# Patient Record
Sex: FEMALE | Race: WHITE
[De-identification: names, ages, dates, MRNs, and addresses within clinical notes are randomized per-mention and may not be internally consistent; named-entity substitution may affect disease eponyms.]

---

## 2017-01-01 ENCOUNTER — HOSPITAL ENCOUNTER (EMERGENCY)
Dept: HOSPITAL 62 - ER | Age: 42
Discharge: HOME | End: 2017-01-01
Payer: MEDICAID

## 2017-01-01 VITALS — DIASTOLIC BLOOD PRESSURE: 67 MMHG | SYSTOLIC BLOOD PRESSURE: 111 MMHG

## 2017-01-01 DIAGNOSIS — N39.0: Primary | ICD-10-CM

## 2017-01-01 DIAGNOSIS — F17.210: ICD-10-CM

## 2017-01-01 DIAGNOSIS — M54.5: ICD-10-CM

## 2017-01-01 DIAGNOSIS — R30.0: ICD-10-CM

## 2017-01-01 DIAGNOSIS — Z98.51: ICD-10-CM

## 2017-01-01 DIAGNOSIS — R11.0: ICD-10-CM

## 2017-01-01 LAB
ALBUMIN SERPL-MCNC: 4.5 G/DL (ref 3.5–5)
ALP SERPL-CCNC: 59 U/L (ref 38–126)
ALT SERPL-CCNC: 24 U/L (ref 9–52)
ANION GAP SERPL CALC-SCNC: 13 MMOL/L (ref 5–19)
APPEARANCE UR: (no result)
AST SERPL-CCNC: 13 U/L (ref 14–36)
BASOPHILS # BLD AUTO: 0 10^3/UL (ref 0–0.2)
BASOPHILS NFR BLD AUTO: 0.2 % (ref 0–2)
BILIRUB DIRECT SERPL-MCNC: 0 MG/DL (ref 0–0.3)
BILIRUB SERPL-MCNC: 1 MG/DL (ref 0.2–1.3)
BILIRUB UR QL STRIP: NEGATIVE
BUN SERPL-MCNC: 13 MG/DL (ref 7–20)
CALCIUM: 9.4 MG/DL (ref 8.4–10.2)
CHLORIDE SERPL-SCNC: 105 MMOL/L (ref 98–107)
CO2 SERPL-SCNC: 23 MMOL/L (ref 22–30)
CREAT SERPL-MCNC: 0.6 MG/DL (ref 0.52–1.25)
EOSINOPHIL # BLD AUTO: 0 10^3/UL (ref 0–0.6)
EOSINOPHIL NFR BLD AUTO: 0 % (ref 0–6)
ERYTHROCYTE [DISTWIDTH] IN BLOOD BY AUTOMATED COUNT: 14.5 % (ref 11.5–14)
GLUCOSE SERPL-MCNC: 86 MG/DL (ref 75–110)
GLUCOSE UR STRIP-MCNC: NEGATIVE MG/DL
HCT VFR BLD CALC: 38 % (ref 36–47)
HGB BLD-MCNC: 12.5 G/DL (ref 12–15.5)
HGB HCT DIFFERENCE: -0.5
KETONES UR STRIP-MCNC: 20 MG/DL
LYMPHOCYTES # BLD AUTO: 1.8 10^3/UL (ref 0.5–4.7)
LYMPHOCYTES NFR BLD AUTO: 12.4 % (ref 13–45)
MCH RBC QN AUTO: 26.2 PG (ref 27–33.4)
MCHC RBC AUTO-ENTMCNC: 33 G/DL (ref 32–36)
MCV RBC AUTO: 79 FL (ref 80–97)
MONOCYTES # BLD AUTO: 1.1 10^3/UL (ref 0.1–1.4)
MONOCYTES NFR BLD AUTO: 7.5 % (ref 3–13)
NEUTROPHILS # BLD AUTO: 11.4 10^3/UL (ref 1.7–8.2)
NEUTS SEG NFR BLD AUTO: 79.9 % (ref 42–78)
NITRITE UR QL STRIP: NEGATIVE
PH UR STRIP: 6 [PH] (ref 5–9)
POTASSIUM SERPL-SCNC: 4 MMOL/L (ref 3.6–5)
PROT SERPL-MCNC: 7.8 G/DL (ref 6.3–8.2)
PROT UR STRIP-MCNC: NEGATIVE MG/DL
RBC # BLD AUTO: 4.79 10^6/UL (ref 3.72–5.28)
SODIUM SERPL-SCNC: 140.5 MMOL/L (ref 137–145)
SP GR UR STRIP: 1.01
UROBILINOGEN UR-MCNC: NEGATIVE MG/DL (ref ?–2)
WBC # BLD AUTO: 14.3 10^3/UL (ref 4–10.5)

## 2017-01-01 PROCEDURE — 87186 SC STD MICRODIL/AGAR DIL: CPT

## 2017-01-01 PROCEDURE — S0119 ONDANSETRON 4 MG: HCPCS

## 2017-01-01 PROCEDURE — 87086 URINE CULTURE/COLONY COUNT: CPT

## 2017-01-01 PROCEDURE — 99283 EMERGENCY DEPT VISIT LOW MDM: CPT

## 2017-01-01 PROCEDURE — 80053 COMPREHEN METABOLIC PANEL: CPT

## 2017-01-01 PROCEDURE — 36415 COLL VENOUS BLD VENIPUNCTURE: CPT

## 2017-01-01 PROCEDURE — 85025 COMPLETE CBC W/AUTO DIFF WBC: CPT

## 2017-01-01 PROCEDURE — 81001 URINALYSIS AUTO W/SCOPE: CPT

## 2017-01-01 PROCEDURE — 87088 URINE BACTERIA CULTURE: CPT

## 2017-01-01 NOTE — ER DOCUMENT REPORT
ED Medical Screen (RME)





- General


Time seen by provider: 12:20


Mode of Arrival: Ambulatory


Information source: Patient


TRAVEL OUTSIDE OF THE [place] IN LAST 30 DAYS: No





<CORNELIO NO - Last Filed: 01/01/17 12:20>





<APPLE FERREIRA - Last Filed: 01/10/17 12:14>





- General


Stated Complaint: BACK PAIN/NAUSEA


Notes: 


40 yo female with right flank pain, dysuria, urgency with small amounts of 

urine since friday. Nausea with some vomiting. Feels like she has another 

kidney infection. (CORNELIO NO)





- Related Data


Allergies/Adverse Reactions: 


 





No Known Allergies Allergy (Verified 02/06/14 10:12)


 











Past Medical History


Past Surgical History: Reports: Hx Appendectomy, Hx Tubal Ligation





- Immunizations


Hx Diphtheria, Pertussis, Tetanus Vaccination: Yes





<CORNELIO NO - Last Filed: 01/01/17 12:20>





Course





- Laboratory


Result Diagrams: 


 01/01/17 13:27





 01/01/17 13:27





<APPLE FERREIRA - Last Filed: 01/10/17 12:14>





- Vital Signs


Vital signs: 





 











Temp Pulse Resp BP Pulse Ox


 


 98.4 F   99   16   111/67   99 


 


 01/01/17 15:33  01/01/17 15:33  01/01/17 15:33  01/01/17 15:33  01/01/17 15:33











 (APPLE FERREIRA)





- Laboratory


Laboratory results interpreted by me: 





 











  01/01/17 01/01/17 01/01/17





  12:27 13:27 13:27


 


WBC   14.3 H 


 


MCV   79 L 


 


MCH   26.2 L 


 


RDW   14.5 H 


 


Seg Neutrophils %   79.9 H 


 


Lymphocytes %   12.4 L 


 


Absolute Neutrophils   11.4 H 


 


AST    13 L


 


Urine Ketones  20 H  


 


Urine Blood  MODERATE H  


 


Ur Leukocyte Esterase  SMALL H  











 (APPLE FERREIRA)





Doctor's Discharge





<CORNELIO NO - Last Filed: 01/01/17 12:20>





<APPLE FERREIRA - Last Filed: 01/10/17 12:14>





- Discharge


Clinical Impression: 


 Acute low back pain, Urinary tract infection





Condition: Stable


Disposition: HOME, SELF-CARE


Additional Instructions: 


Urinary Tract Infection:





     Your evaluation indicates that you have a urinary tract infection. This is 

due to germs growing in the bladder.  This is a common problem.


     This infection usually responds quickly to antibiotics.  Your antibiotic 

should be taken exactly as prescribed.  Drink plenty of fluids -- three to four 

quarts a day.


     Occasionally, a bladder anesthetic will be prescribed to help stop the 

feeling of urgency until the antibiotic has a chance to clear the infection.  

This may cause your urine to be dark orange.


     Certain urine infections require a culture.  If the doctor obtained a 

culture, the results will be back in two days.  You should call to see if a 

change in treatment is needed.


     A repeat urinalysis after you finish treatment is often recommended.  The 

physician will let you know if further testing is required.


     Call the doctor if you develop fever, chills, flank pain, inability to 

urinate, or blood in the urine.








Low Back Pain:





     Three out of every four people will have an episode of disabling back pain 

during their lifetime.  Most commonly the pain is due to straining of the 

muscles and ligaments in the low back.


     Usual treatment includes: 


(1) Rest on a firm surface.  Avoid lying on your stomach.  


(2) Ice pack the painful area.  After a few days, gentle heat may be used 

intermittently to relax the area, or ice packs can be continued.  


(3) Medication may be needed -- muscle relaxers and antiinflammatory medicines 

are commonly used.  


(4) As the back improves, exercises are prescribed to strengthen the back and 

abdominal muscles.


     Your doctor will advise you on the proper care for your back at each stage 

in your recovery.  You may be better in a few days -- or healing may take 

several weeks.


     If new symptoms of a "herniated disc" (radiation of pain, numbness, or 

tingling down the back of the leg or weakness in the leg) occur, you should be 

re-examined.  Further testing may be necessary.








DRINK PLENTY OF FLUIDS.


TAKE THE MEDICATION AS PRESCRIBED.


TAKE TYLENOL AND MOTRIN OR ALEVE FOR LOW BACK PAIN.


TAKE AZO-STANDARD FOR BURNING WITH URINATION.


FOLLOW UP WITH YOUR DOCTOR THIS WEEK IF NOT IMPROVING.





RETURN TO THE EMERGENCY ROOM IF ANY NEW OR WORSENING SYMPTOMS.











Prescriptions: 


Cephalexin Monohydrate [Keflex 500 mg Capsule] 500 mg PO TID #15 capsule


Referrals: 


JONO ERAZO MD [Primary Care Provider] - Follow up as needed

## 2017-01-01 NOTE — ER DOCUMENT REPORT
ED GI/





<OMARI ALEXANDER - Last Filed: 01/01/17 15:19>





- General


Mode of Arrival: Ambulatory


Information source: Patient


TRAVEL OUTSIDE OF THE [place] IN LAST 30 DAYS: No





- HPI


Patient complains to provider of: Dysuria, Flank pain


Onset: Other - "few days ago"


Location: Left flank, Right flank


Associated symptoms: Other - see above





<SHAYNE ERAZO - Last Filed: 01/01/17 15:53>





- General


Chief Complaint: Flank Pain


Stated Complaint: BACK PAIN/NAUSEA


Notes: 


41 year old female with history of endometriosis presents to the ED complaining 

of bilateral flank pain that began "a few days ago". Patient is additionally 

complaining of dysuria, burning with urination, nausea, and a cough.  (SHAYNE ERAZO)





- Related Data


Allergies/Adverse Reactions: 


 





No Known Allergies Allergy (Verified 02/06/14 10:12)


 











Past Medical History





- General


Information source: Patient





- Social History


Smoking Status: Current Every Day Smoker


Cigarette use (# per day): Yes - 1 ppd


Family History: Reviewed & Not Pertinent


Renal/ Medical History: Reports: Other - Endometriosis


Past Surgical History: Reports: Hx Appendectomy, Hx Tubal Ligation





- Immunizations


Hx Diphtheria, Pertussis, Tetanus Vaccination: Yes





<SHAYNE ERAZO - Last Filed: 01/01/17 15:53>





Review of Systems





- Review of Systems


Constitutional: No symptoms reported


EENT: No symptoms reported


Cardiovascular: No symptoms reported


Respiratory: See HPI, Cough


Gastrointestinal: See HPI, Nausea


Genitourinary: See HPI, Burning, Dysuria, Flank pain - bilateral


Female Genitourinary: No symptoms reported


Musculoskeletal: No symptoms reported


Skin: No symptoms reported


Hematologic/Lymphatic: No symptoms reported


Neurological/Psychological: No symptoms reported


-: Yes All other systems reviewed and negative





<SHAYNE ERAZO - Last Filed: 01/01/17 15:53>





Physical Exam





- General


General appearance: Alert


In distress: None





- HEENT


Head: Normocephalic, Atraumatic


Eyes: Normal


Extraocular movements intact: Yes


Pupils: PERRL





- Respiratory


Respiratory status: No respiratory distress


Breath sounds: Rhonchi - bilateral





- Cardiovascular


Rhythm: Regular


Heart sounds: Normal auscultation





- Abdominal


Inspection: Normal





- Back


Back: Tender - paraspinal lumbar very tender to palpate





- Extremities


General upper extremity: Normal inspection, Normal ROM


General lower extremity: Normal inspection, Normal ROM





- Neurological


Neuro grossly intact: Yes





- Psychological


Associated symptoms: Normal affect, Normal mood





- Skin


Skin Temperature: Warm


Skin Moisture: Dry


Skin Color: Normal





<SHAYNE ERAZO - Last Filed: 01/01/17 15:53>





- Vital signs


Vitals: 


 











Temp Pulse Resp BP Pulse Ox


 


 98.6 F   122 H  18   130/81 H  100 


 


 01/01/17 12:19  01/01/17 12:19  01/01/17 12:19  01/01/17 12:19  01/01/17 12:19








 (OMARI ALEXANDER)





Course





- Laboratory


Result Diagrams: 


 01/01/17 13:27





 01/01/17 13:27





<OMARI ALEXANDER - Last Filed: 01/01/17 15:19>





- Laboratory


Result Diagrams: 


 01/01/17 13:27





 01/01/17 13:27





<SHAYNE ERAZO - Last Filed: 01/01/17 15:53>





- Vital Signs


Vital signs: 


 











Temp Pulse Resp BP Pulse Ox


 


 98.4 F   99   16   111/67   99 


 


 01/01/17 15:33  01/01/17 15:33  01/01/17 15:33  01/01/17 15:33  01/01/17 15:33








 (OMARI ALEXANDER)


 (SHAYNE ERAZO)





- Laboratory


Laboratory results interpreted by me: 


 











  01/01/17 01/01/17 01/01/17





  12:27 13:27 13:27


 


WBC   14.3 H 


 


MCV   79 L 


 


MCH   26.2 L 


 


RDW   14.5 H 


 


Seg Neutrophils %   79.9 H 


 


Lymphocytes %   12.4 L 


 


Absolute Neutrophils   11.4 H 


 


AST    13 L


 


Urine Ketones  20 H  


 


Urine Blood  MODERATE H  


 


Ur Leukocyte Esterase  SMALL H  








 (OMARI ALEXANDER)


 (SHAYNE ERAZO)





Scribe Documentation





- Scribe


Written by Ramiro:: Ramiro Sabillon, 01/01/2017 15:52


acting as scribe for :: Roseann





<SHAYNE ERAZO - Last Filed: 01/01/17 15:53>

## 2017-01-03 ENCOUNTER — HOSPITAL ENCOUNTER (OUTPATIENT)
Dept: HOSPITAL 62 - OD | Age: 42
End: 2017-01-03
Attending: PHYSICIAN ASSISTANT
Payer: MEDICAID

## 2017-01-03 DIAGNOSIS — R10.9: Primary | ICD-10-CM

## 2017-01-03 PROCEDURE — 74000: CPT

## 2017-03-12 ENCOUNTER — HOSPITAL ENCOUNTER (EMERGENCY)
Dept: HOSPITAL 62 - ER | Age: 42
Discharge: HOME | End: 2017-03-12
Payer: MEDICAID

## 2017-03-12 VITALS — DIASTOLIC BLOOD PRESSURE: 63 MMHG | SYSTOLIC BLOOD PRESSURE: 109 MMHG
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
## 2017-03-12 DIAGNOSIS — M79.642: ICD-10-CM

## 2017-03-12 DIAGNOSIS — T23.062A: Primary | ICD-10-CM

## 2017-03-12 DIAGNOSIS — F17.200: ICD-10-CM

## 2017-03-12 PROCEDURE — 99283 EMERGENCY DEPT VISIT LOW MDM: CPT
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
## 2017-08-24 ENCOUNTER — HOSPITAL ENCOUNTER (EMERGENCY)
Dept: HOSPITAL 62 - ER | Age: 42
Discharge: HOME | End: 2017-08-24
Payer: SELF-PAY

## 2017-08-24 VITALS — SYSTOLIC BLOOD PRESSURE: 128 MMHG | DIASTOLIC BLOOD PRESSURE: 74 MMHG

## 2017-08-24 DIAGNOSIS — J02.9: ICD-10-CM

## 2017-08-24 DIAGNOSIS — J02.0: Primary | ICD-10-CM

## 2017-08-24 DIAGNOSIS — R07.0: ICD-10-CM

## 2017-08-24 DIAGNOSIS — H92.03: ICD-10-CM

## 2017-08-24 PROCEDURE — 99283 EMERGENCY DEPT VISIT LOW MDM: CPT

## 2017-08-24 PROCEDURE — 96372 THER/PROPH/DIAG INJ SC/IM: CPT

## 2017-08-24 PROCEDURE — 87880 STREP A ASSAY W/OPTIC: CPT

## 2017-08-24 NOTE — ER DOCUMENT REPORT
ED General





- General


Chief Complaint: Sore Throat


Stated Complaint: SORE THROAT


Time Seen by Provider: 08/24/17 07:02


TRAVEL OUTSIDE OF THE [place] IN LAST 30 DAYS: No





- HPI


Patient complains to provider of: Throat pain bilateral ear pain


Notes: 





Patient coming in for throat pain bilateral ear pain ongoing for the last 2 

days.  Patient states daughter has similar symptoms.  Patient denies any fevers 

chills nausea vomiting diarrhea.  Patient denies any other sick contacts denies 

any recent antibiotics denies any recent steroids.  Patient does smoke.  Denies 

any difficulty breathing or swallowing states painful swallowing.  No drooling 

noted upon evaluation to trismus patient is maintaining her airway patient is 

no signs of impending airway compromise





- Related Data


Allergies/Adverse Reactions: 


 





No Known Allergies Allergy (Verified 08/24/17 06:40)


 











Past Medical History





- Social History


Smoking Status: Unknown if Ever Smoked


Chew tobacco use (# tins/day): No


Frequency of alcohol use: None


Drug Abuse: None


Family History: Reviewed & Not Pertinent


Patient has suicidal ideation: No


Patient has homicidal ideation: No


Renal/ Medical History: Denies: Hx Peritoneal Dialysis


GI Medical History: Reports: Hx Gastroesophageal Reflux Disease


Past Surgical History: Reports: Hx Appendectomy, Hx Tubal Ligation





- Immunizations


Hx Diphtheria, Pertussis, Tetanus Vaccination: Yes





Review of Systems





- Review of Systems


Constitutional: No symptoms reported


EENT: Other - Throat pain


Cardiovascular: No symptoms reported


Respiratory: No symptoms reported


Gastrointestinal: No symptoms reported


Genitourinary: No symptoms reported


Female Genitourinary: No symptoms reported


Musculoskeletal: No symptoms reported


Skin: No symptoms reported


Hematologic/Lymphatic: No symptoms reported


Neurological/Psychological: No symptoms reported





Physical Exam





- Vital signs


Vitals: 


 











Temp Pulse Resp BP Pulse Ox


 


 98.9 F   114 H  18   128/74 H  98 


 


 08/24/17 06:37  08/24/17 06:37  08/24/17 06:37  08/24/17 06:37  08/24/17 06:37











Interpretation: Normal





- General


General appearance: Appears well, Alert





- HEENT


Head: Normocephalic, Atraumatic


Eyes: Normal


Conjunctiva: Normal


Cornea: Normal


Extraocular movements intact: Yes


Eyelashes: Normal


Pupils: PERRL


Ears: Normal


External canal: Normal


Tympanic membrane: Normal


Sinus: Normal


Nasal: Normal


Pharynx: Erythema, Exudate


Neck: Normal





- Respiratory


Respiratory status: No respiratory distress


Chest status: Nontender


Breath sounds: Normal


Chest palpation: Normal





- Cardiovascular


Rhythm: Regular


Heart sounds: Normal auscultation


Murmur: No





- Abdominal


Inspection: Normal


Distension: No distension


Bowel sounds: Normal


Tenderness: Nontender


Organomegaly: No organomegaly





- Back


Back: Normal, Nontender





- Extremities


General upper extremity: Normal inspection, Nontender, Normal color, Normal ROM

, Normal temperature


General lower extremity: Normal inspection, Nontender, Normal color, Normal ROM

, Normal temperature, Normal weight bearing.  No: Cathie's sign





- Neurological


Neuro grossly intact: Yes


Cognition: Normal


Orientation: AAOx4


[place] Coma Scale Eye Opening: Spontaneous


[place] Coma Scale Verbal: Oriented


[place] Coma Scale Motor: Obeys Commands


[place] Coma Scale Total: 15


Speech: Normal


Motor strength normal: LUE, RUE, LLE, RLE


Sensory: Normal





- Psychological


Associated symptoms: Normal affect, Normal mood





- Skin


Skin Temperature: Warm


Skin Moisture: Dry


Skin Color: Normal





Course





- Re-evaluation


Re-evalutation: 





08/24/17 14:04


Examinations consistent with strep no signs of impending airway compromise 

patient prefers Bicillin will also give Decadron recommended using Motrin and 

Tylenol for pain control.  No signs of abscess formation.





- Vital Signs


Vital signs: 


 











Temp Pulse Resp BP Pulse Ox


 


 98.9 F   114 H  18   128/74 H  98 


 


 08/24/17 06:37  08/24/17 06:37  08/24/17 06:37  08/24/17 06:37  08/24/17 06:37














Discharge





- Discharge


Clinical Impression: 


 Strep pharyngitis





Condition: Good


Disposition: HOME, SELF-CARE


Instructions:  Strep Throat ([facility])


Additional Instructions: 


It is very important that she continue to drink plenty of fluids to stay 

hydrated.  We gave you a dose of antibiotics today in the ER he would not need 

any further antibiotics.  We also gave you a dose of steroids that will help 

out with your sore throat.  You can expect the pain can to continue for the 

next 72-96 hours.  Please make sure that she stay well-hydrated.


Referrals: 


JONO ERAZO MD [Primary Care Provider] - Follow up as needed

## 2017-10-05 ENCOUNTER — HOSPITAL ENCOUNTER (EMERGENCY)
Dept: HOSPITAL 62 - ER | Age: 42
Discharge: HOME | End: 2017-10-05
Payer: MEDICAID

## 2017-10-05 VITALS — SYSTOLIC BLOOD PRESSURE: 116 MMHG | DIASTOLIC BLOOD PRESSURE: 72 MMHG

## 2017-10-05 DIAGNOSIS — Z98.51: ICD-10-CM

## 2017-10-05 DIAGNOSIS — B96.89: ICD-10-CM

## 2017-10-05 DIAGNOSIS — N76.0: Primary | ICD-10-CM

## 2017-10-05 DIAGNOSIS — M54.9: ICD-10-CM

## 2017-10-05 DIAGNOSIS — F17.200: ICD-10-CM

## 2017-10-05 LAB
APPEARANCE UR: (no result)
BILIRUB UR QL STRIP: NEGATIVE
CHLAM PCR: NOT DETECTED
GLUCOSE UR STRIP-MCNC: NEGATIVE MG/DL
KETONES UR STRIP-MCNC: (no result) MG/DL
NITRITE UR QL STRIP: NEGATIVE
PH UR STRIP: 6 [PH] (ref 5–9)
PROT UR STRIP-MCNC: NEGATIVE MG/DL
SP GR UR STRIP: 1.01
UROBILINOGEN UR-MCNC: 2 MG/DL (ref ?–2)

## 2017-10-05 PROCEDURE — 99283 EMERGENCY DEPT VISIT LOW MDM: CPT

## 2017-10-05 PROCEDURE — 81001 URINALYSIS AUTO W/SCOPE: CPT

## 2017-10-05 PROCEDURE — 87491 CHLMYD TRACH DNA AMP PROBE: CPT

## 2017-10-05 PROCEDURE — 87591 N.GONORRHOEAE DNA AMP PROB: CPT

## 2017-10-05 PROCEDURE — 87210 SMEAR WET MOUNT SALINE/INK: CPT

## 2017-10-05 NOTE — ER DOCUMENT REPORT
ED GI/





- General


Chief Complaint: Urinary Problem


Stated Complaint: BACK PAIN


Time Seen by Provider: 10/05/17 12:29


Mode of Arrival: Ambulatory


Information source: Patient


TRAVEL OUTSIDE OF THE [place] IN LAST 30 DAYS: No





- Related Data


Allergies/Adverse Reactions: 


 





No Known Allergies Allergy (Verified 08/24/17 06:40)


 











Past Medical History





- Social History


Smoking Status: Current Every Day Smoker


Chew tobacco use (# tins/day): No


Frequency of alcohol use: None


Drug Abuse: None


Family History: Reviewed & Not Pertinent


Renal/ Medical History: Denies: Hx Peritoneal Dialysis


GI Medical History: Reports: Hx Gastroesophageal Reflux Disease


Past Surgical History: Reports: Hx Appendectomy, Hx Tubal Ligation





- Immunizations


Hx Diphtheria, Pertussis, Tetanus Vaccination: Yes





Physical Exam





- Vital signs


Vitals: 


 











Temp Pulse BP Pulse Ox


 


 98.1 F   94   112/77   100 


 


 10/05/17 12:12  10/05/17 12:12  10/05/17 12:12  10/05/17 12:12














Course





- Vital Signs


Vital signs: 


 











Temp Pulse Resp BP Pulse Ox


 


 98.1 F   94      112/77   100 


 


 10/05/17 12:12  10/05/17 12:12     10/05/17 12:12  10/05/17 12:12














- Laboratory


Laboratory results interpreted by me: 


 











  10/05/17





  12:15


 


Urine Ketones  TRACE H


 


Urine Blood  MODERATE H


 


Urine Urobilinogen  2.0 H














Discharge





- Discharge


Clinical Impression: 


 Bacterial vaginosis





Condition: Stable


Disposition: HOME, SELF-CARE


Additional Instructions: 


Please follow up with your OBGYN, return here for worsening symptoms. 


Prescriptions: 


Metronidazole [Flagyl 500 mg Tablet] 500 mg PO BID #28 tablet


Forms:  Return to Work

## 2017-12-04 ENCOUNTER — HOSPITAL ENCOUNTER (EMERGENCY)
Dept: HOSPITAL 62 - ER | Age: 42
Discharge: HOME | End: 2017-12-04
Payer: MEDICAID

## 2017-12-04 VITALS — SYSTOLIC BLOOD PRESSURE: 109 MMHG | DIASTOLIC BLOOD PRESSURE: 72 MMHG

## 2017-12-04 DIAGNOSIS — F17.210: ICD-10-CM

## 2017-12-04 DIAGNOSIS — Z98.51: ICD-10-CM

## 2017-12-04 DIAGNOSIS — L02.213: Primary | ICD-10-CM

## 2017-12-04 DIAGNOSIS — M25.512: ICD-10-CM

## 2017-12-04 PROCEDURE — 0H95XZZ DRAINAGE OF CHEST SKIN, EXTERNAL APPROACH: ICD-10-PCS | Performed by: PHYSICIAN ASSISTANT

## 2017-12-04 PROCEDURE — 99283 EMERGENCY DEPT VISIT LOW MDM: CPT

## 2017-12-04 NOTE — ER DOCUMENT REPORT
ED Medical Screen (RME)





- General


Chief Complaint: Skin Sore(s)


Stated Complaint: BREAST PAIN


Time Seen by Provider: 12/04/17 15:27


Mode of Arrival: Ambulatory


Information source: Patient


Notes: 





Patient has bump on the left side of shest above the breast. Appeared Monday. 

Attempted warm compresses and no help. Pain now radiating into the left 

shoulder . It is a burning pain.Hard 


TRAVEL OUTSIDE OF THE [place] IN LAST 30 DAYS: No





- HPI


Onset: Yesterday


Onset/Duration: Gradual, Constant


Quality of pain: Burning, Sharp, Throbbing


Pain Level: 3


Associated Symptoms: None


Exacerbated by: Denies


Relieved by: Denies


Similar symptoms previously: No


Recently seen / treated by doctor: No





- Related Data


Smoking: Non-smoker


Frequency of alcohol use: None


Drug Abuse: None


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


Allergies/Adverse Reactions: 


 





No Known Allergies Allergy (Verified 12/04/17 14:13)


 











Past Medical History





- General


Information source: Patient





- Social History


Cigarette use (# per day): Yes - 1 ppd


Chew tobacco use (# tins/day): No


Frequency of alcohol use: None


Drug Abuse: None


Lives with: Family


Family history: Reviewed & Not Pertinent


Renal/ Medical History: Denies: Hx Peritoneal Dialysis


GI Medical History: Reports: Hx Gastroesophageal Reflux Disease


Past Surgical History: Reports: Hx Appendectomy, Hx Tubal Ligation





- Immunizations


Hx Diphtheria, Pertussis, Tetanus Vaccination: Yes





Review of Systems





- Review of Systems


-: Yes ROS unobtainable due to patient's medical condition


Constitutional: No symptoms reported


EENT: No symptoms reported


Cardiovascular: No symptoms reported


Respiratory: No symptoms reported


Gastrointestinal: No symptoms reported


Genitourinary: No symptoms reported


Female Genitourinary: No symptoms reported


Musculoskeletal: No symptoms reported


Skin: Lumps


Hematologic/Lymphatic: No symptoms reported


Neurological/Psychological: No symptoms reported


-: Yes All other systems reviewed and negative





Physical Exam





- Vital signs


Vitals: 





 











Temp Pulse Resp BP Pulse Ox


 


 99.2 F   94   15   106/74   99 


 


 12/04/17 14:16  12/04/17 14:16  12/04/17 14:16  12/04/17 14:16  12/04/17 14:16











Interpretation: Normal





- General


General appearance: Appears well





- Respiratory


Respiratory status: No respiratory distress


Chest status: Nontender


Breath sounds: Normal





- Cardiovascular


Rhythm: Regular


Heart sounds: Normal auscultation


Murmur: No





- Extremities


General upper extremity: Normal inspection


General lower extremity: Normal inspection





- Skin


Skin Temperature: Warm


Skin Moisture: Dry


Skin Color: Other - Patient has a 2 cm bump on the right side of the chest. 

Tender to touch. Firm with a head. Surrounded by erythema out 2 cm.





Course





- Vital Signs


Vital signs: 





 











Temp Pulse Resp BP Pulse Ox


 


 99.2 F   94   15   106/74   99 


 


 12/04/17 14:16  12/04/17 14:16  12/04/17 14:16  12/04/17 14:16  12/04/17 14:16














Procedures





- Incision and Drainage


  ** Left Chest


Time completed: 15:51


Type: Single


Anesthetic type: 1% Lidocaine


mL's of anesthetic: 1


Blade size: 11


I&D procedure: Betadine prep applied


Incision Method: Incision made by scalpel


Amount/type of drainage: small


Notes: 





12/04/17 15:52


Area cleaned with betadine and I used 11 blade after 2 ml of lidocain. I then 

put in a 1 cm puncture lac and found most to be inflammed tissue. Applied 

preswsure and a hard firm core came out. No drainage. Ptient tolerated tx and 

procedure well. 





Doctor's Discharge





- Discharge


Clinical Impression: 


 Abscess





Condition: Good


Disposition: HOME, SELF-CARE


Instructions:  Abscess ([facility]), Cephalexin ([facility])


Additional Instructions: 


Home and rest. Warm cpomprdesses 3 times a day. Take all the antibiotics. 

Return to ER any concerns


Prescriptions: 


Cephalexin [Keflex] 500 mg PO QID #40 capsule


Fluconazole [Diflucan] 150 mg PO ONCE PRN #1 tablet


 PRN Reason: 


Hydrocodone/Acetaminophen [Norco 7.5-325 mg Tablet] 1 tab PO Q6 #15 tablet


Sulfamethoxazole/Trimethoprim [Bactrim Ds Tablet] 1 each PO BID #20 tablet


Forms:  Elevated Blood Pressure, Smoking Cessation Education

## 2018-10-06 ENCOUNTER — HOSPITAL ENCOUNTER (EMERGENCY)
Dept: HOSPITAL 62 - ER | Age: 43
Discharge: HOME | End: 2018-10-06
Payer: SELF-PAY

## 2018-10-06 VITALS — SYSTOLIC BLOOD PRESSURE: 112 MMHG | DIASTOLIC BLOOD PRESSURE: 84 MMHG

## 2018-10-06 DIAGNOSIS — R30.0: ICD-10-CM

## 2018-10-06 DIAGNOSIS — F17.200: ICD-10-CM

## 2018-10-06 DIAGNOSIS — M54.5: Primary | ICD-10-CM

## 2018-10-06 DIAGNOSIS — R10.9: ICD-10-CM

## 2018-10-06 LAB
APPEARANCE UR: CLEAR
APTT PPP: YELLOW S
BILIRUB UR QL STRIP: NEGATIVE
GLUCOSE UR STRIP-MCNC: NEGATIVE MG/DL
KETONES UR STRIP-MCNC: NEGATIVE MG/DL
NITRITE UR QL STRIP: NEGATIVE
PH UR STRIP: 7 [PH] (ref 5–9)
PROT UR STRIP-MCNC: NEGATIVE MG/DL
SP GR UR STRIP: 1.02
UROBILINOGEN UR-MCNC: 4 MG/DL (ref ?–2)

## 2018-10-06 PROCEDURE — 99283 EMERGENCY DEPT VISIT LOW MDM: CPT

## 2018-10-06 PROCEDURE — 87086 URINE CULTURE/COLONY COUNT: CPT

## 2018-10-06 PROCEDURE — 81001 URINALYSIS AUTO W/SCOPE: CPT

## 2018-10-06 NOTE — ER DOCUMENT REPORT
HPI





- HPI


Pain Level: 5


Notes: 





Patient is a 43-year-old female who presents with chief complaint of right 

flank pain and dysuria times 1 week.  Patient reports she thinks she may have a 

urinary tract infection or kidney infection.  Patient denies any fevers, 

vomiting or chills.





- CONSTITUTIONAL


Constitutional: DENIES: Fever, Chills





- EENT


EENT: DENIES: Sore Throat, Ear Pain, Eye problems





- NEURO


Neurology: DENIES: Headache, Weakness, Vision blurred, Dizzinesss / Vertigo





- CARDIOVASCULAR


Cardiovascular: DENIES: Chest pain





- RESPIRATORY


Respiratory: DENIES: Trouble Breathing, Coughing





- GASTROINTESTINAL


Gastrointestinal: DENIES: Abdominal Pain, Black / Bloody Stools





- REPRODUCTIVE


Reproductive: DENIES: Pregnant:





- MUSCULOSKELETAL


Musculoskeletal: DENIES: Extremity pain





Past Medical History





- General


Information source: Patient





- Social History


Smoking Status: Current Every Day Smoker


Chew tobacco use (# tins/day): No


Frequency of alcohol use: Occasional


Drug Abuse: None


Family History: Reviewed & Not Pertinent


Patient has suicidal ideation: No


Patient has homicidal ideation: No


Renal/ Medical History: Denies: Hx Peritoneal Dialysis


GI Medical History: Reports: Hx Gastroesophageal Reflux Disease


Past Surgical History: Reports: Hx Appendectomy, Hx Tubal Ligation





- Immunizations


Hx Diphtheria, Pertussis, Tetanus Vaccination: Yes





Vertical Provider Document





- CONSTITUTIONAL


Notes: 





PHYSICAL EXAMINATION:





GENERAL: Well-appearing, well-nourished and in no acute distress.





HEAD: Atraumatic, normocephalic.





EYES: Pupils equal round and reactive to light, extraocular movements intact, 

conjunctiva are normal.





ENT: Nares patent, oropharynx clear without exudates.  Moist mucous membranes.





NECK: Normal range of motion, supple without lymphadenopathy





LUNGS: Breath sounds clear to auscultation bilaterally and equal.  No wheezes 

rales or rhonchi.





HEART: Regular rate and rhythm without murmurs





ABDOMEN: Soft, nontender, nondistended abdomen.  No guarding, no rebound.  No 

masses appreciated.





Female : Right CVA tenderness present





Musculoskeletal: Normal range of motion, no pitting or edema.  No cyanosis.





NEUROLOGICAL: Cranial nerves grossly intact.  Normal speech, normal gait.  

Normal sensory, motor exams





PSYCH: Normal mood, normal affect.





SKIN: Warm, Dry, normal turgor, no rashes or lesions noted.





- INFECTION CONTROL


TRAVEL OUTSIDE OF THE [place] IN LAST 30 DAYS: No





Course





- Vital Signs


Vital signs: 


 











Temp Pulse Resp BP Pulse Ox


 


 97.7 F   80   18   112/84   100 


 


 10/06/18 12:14  10/06/18 12:14  10/06/18 12:14  10/06/18 12:14  10/06/18 12:14














Discharge





- Discharge


Clinical Impression: 


 Dysuria





Back pain


Qualifiers:


 Back pain location: low back pain Chronicity: acute Back pain laterality: 

right Sciatica presence: without sciatica Qualified Code(s): M54.5 - Low back 

pain





Condition: Stable


Disposition: HOME, SELF-CARE


Additional Instructions: 


Muscle Strain





     You have strained a muscle -- torn the fibers within the muscle. This 

often occurs with strenuous exertion, or during an injury that suddenly 

stretches the muscle.  The seriousness of a strain varies. Some strains heal 

within days, others cause problems for months.


     X-rays cannot show a muscle strain.  X-rays are taken only if symptoms 

suggest that a fracture could be present.


     The usual treatment of a muscle strain is rest and ice packs. Sometimes, a 

sling, splint, or crutches may be necessary to rest the muscle.  The muscle can 

be used again once pain subsides.  Severe strains require a special exercise 

and stretching program to prevent permanent stiffness and disability.  Your 

doctor will advise you if this will be necessary.


     Call the doctor immediately if pain or swelling becomes severe, or if 

numbness or discoloration develop.





Muscle Relaxers





     Muscle relaxing medications are usually prescribed for acute muscle spasm 

or injury to the neck and back.  They are often combined with antiinflammatory 

pain medication for increased relief.


     You may stop the muscle relaxer when the pain and stiffness have improved.

  Start the medication again if spasms recur.  


     Muscle relaxers may cause drowsiness, especially with the first dose.  Do 

not operate machinery or drive while under the effects of the medication.  Most 

muscle relaxers last up to 24 hours.  Do not combine the medication with 

alcohol.





URINARY TRACT INFECTION:


    Your evaluation indicates that you may have a urinary tract infection. This 

is due to germs growing in the bladder.  This is a common problem.


     This infection usually responds quickly to antibiotics.  Your antibiotic 

should be taken exactly as prescribed.  Drink plenty of fluids -- three to four 

quarts a day.


     Occasionally, a bladder anesthetic will be prescribed to help stop the 

feeling of urgency until the antibiotic has a chance to clear the infection.  

This may cause your urine to be dark orange.


     Certain urine infections require a culture.  If the doctor obtained a 

culture, the results will be back in two days.  You should call to see if a 

change in treatment is needed.


     A repeat urinalysis after you finish treatment is often recommended.  The 

physician will let you know if further testing is required.


     Call the doctor if you develop fever, chills, flank pain, inability to 

urinate, or blood in the urine.





ANTIBIOTIC THERAPY:


     You have been given an antibiotic prescription.  It's important that you 

take all the medication, unless instructed otherwise by your physician.  

Failure to complete the entire course can result in relapse of your condition.


     Common side effects of antibiotics include nausea, intestinal cramping, or 

diarrhea.  Women may develop vaginal yeast infections, and babies can get yeast 

(thrush) in the mouth following the use of antibiotics.  Contact your physician 

if you develop significant side effects from this medication.


     Allergy to this antibiotic can result in hives, wheezing, faintness, or 

itching.  If symptoms of allergy occur, stop the medication and call the doctor.





NITROFURANTOIN (MACRODANTIN, MACROBID):


     You have received a prescription for nitrofurantoin (Macrodantin). This 

antibiotic is used for urinary tract infections.





Women who are pregnant or nursing should notify the physician before taking 

this medicine.  If you have ever had a problem caused by this medication in the 

past, be sure the physician is aware of it.


     Common side effects of this medicine include nausea, vomiting, or 

decreased appetite.  Notify your physician if these side effects become severe.


     Immediately stop this medicine and call the physician if you develop cough

, shortness of breath, chest pain, weakness, jaundice (yellow color of the skin 

and whites of the eyes), or a skin rash.








FOLLOW-UP CARE:


If you have been referred to a physician for follow-up care, call the physician

s office for an appointment as you were instructed or within the next two days.

  If you experience worsening or a significant change in your symptoms, notify 

the physician immediately or return to the Emergency Department at any time for 

re-evaluation.





***Please take medications as prescribed, your urine will be sent for culture 

we will call you if there are any abnormal results.  Follow-up with your 

primary care provider in the next 3-5 days for follow-up, sooner if needed.  

Return to the emergency department if you develop vomiting, fever or any other 

symptom that is concerning to you.***











Prescriptions: 


Hydrocodone/Acetaminophen [Hydrocodon-Acetaminophen 5-325] 1 each PO Q4H PRN #8 

tablet


 PRN Reason: For Pain


Methocarbamol [Robaxin 750 mg Tablet] 750 mg PO ASDIR PRN #40 tablet


 PRN Reason: 


Nitrofurantoin/Nitrofuran Mac [Macrobid 100 mg Capsule] 1 tab PO BID #10 capsule


Referrals: 


JONO ERAZO MD [Primary Care Provider] - Follow up as needed

## 2019-01-27 ENCOUNTER — HOSPITAL ENCOUNTER (EMERGENCY)
Dept: HOSPITAL 62 - ER | Age: 44
Discharge: HOME | End: 2019-01-27
Payer: SELF-PAY

## 2019-01-27 VITALS — SYSTOLIC BLOOD PRESSURE: 107 MMHG | DIASTOLIC BLOOD PRESSURE: 61 MMHG

## 2019-01-27 DIAGNOSIS — M54.9: ICD-10-CM
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
## 2019-01-27 DIAGNOSIS — F17.200: ICD-10-CM

## 2019-01-27 DIAGNOSIS — M54.2: ICD-10-CM

## 2019-01-27 DIAGNOSIS — M25.511: ICD-10-CM

## 2019-01-27 DIAGNOSIS — S16.1XXA: ICD-10-CM

## 2019-01-27 DIAGNOSIS — S43.401A: Primary | ICD-10-CM

## 2019-01-27 DIAGNOSIS — M54.5: ICD-10-CM

## 2019-01-27 DIAGNOSIS — R51: ICD-10-CM

## 2019-01-27 PROCEDURE — 72125 CT NECK SPINE W/O DYE: CPT

## 2019-01-27 PROCEDURE — 72070 X-RAY EXAM THORAC SPINE 2VWS: CPT

## 2019-01-27 PROCEDURE — 72110 X-RAY EXAM L-2 SPINE 4/>VWS: CPT

## 2019-01-27 PROCEDURE — 99284 EMERGENCY DEPT VISIT MOD MDM: CPT

## 2019-01-27 NOTE — RADIOLOGY REPORT (SQ)
EXAM DESCRIPTION:  L SPINE WHOLE



COMPLETED DATE/TIME:  1/27/2019 10:44 am



REASON FOR STUDY:  fall fall injury lumbar pain



COMPARISON:  Lumbar spine plain films 11/3/2011



NUMBER OF VIEWS:  Five views including obliques.



TECHNIQUE:  AP, lateral, oblique, and sacral radiographic images acquired of the lumbar spine.



LIMITATIONS:  None.



FINDINGS:  MINERALIZATION: Normal.

SEGMENTATION: 6 lumbar vertebral bodies, with a fairly well-developed S1-2 disc space

ALIGNMENT: Normal.

VERTEBRAE: Maintained height.  No fracture or worrisome bone lesion.

DISCS: Preserved height.  No significant osteophytes or end plate irregularity.

POSTERIOR ELEMENTS: Pedicles and facets are intact.  No pars defect or posterior arch defects.  Lower
 lumbar mild facet arthropathy.

HARDWARE: None in the spine.  Surgical clips right lower quadrant

PARASPINAL SOFT TISSUES: Normal.

PELVIS: Not in the field of view.  SI joints unremarkable.

OTHER: No other significant finding.



IMPRESSION:  No acute findings



TECHNICAL DOCUMENTATION:  JOB ID:  [id number]

 2011 [employer]- All Rights Reserved



Reading location - IP/workstation name: [email or web address]

## 2019-01-27 NOTE — ER DOCUMENT REPORT
HPI





- HPI


Patient complains to provider of: Fall


Time Seen by Provider: 01/27/19 09:59


Onset: Yesterday


Onset/Duration: Sudden


Quality of pain: Achy


Pain Level: 4


Context: 





Patient states that the lights to her ports were out and she missed a step 

falling landing on her right shoulder.  Patient complains of right shoulder, 

neck and headache pain with low back pain.  Patient denies any loss of 

consciousness nausea or vomiting.


Associated Symptoms: Headache, Other - Right shoulder, neck and back pain


Exacerbated by: Movement


Relieved by: Denies


Similar symptoms previously: No


Recently seen / treated by doctor: No





- ROS


ROS below otherwise negative: Yes


Systems Reviewed and Negative: Yes All other systems reviewed and negative





- NEURO


Neurology: REPORTS: Headache.  DENIES: Weakness, Vision blurred, Dizzinesss / 

Vertigo





- CARDIOVASCULAR


Cardiovascular: DENIES: Chest pain





- RESPIRATORY


Respiratory: DENIES: Trouble Breathing





- GASTROINTESTINAL


Gastrointestinal: DENIES: Nausea





- REPRODUCTIVE


Reproductive: DENIES: Pregnant:





- MUSCULOSKELETAL


Musculoskeletal: REPORTS: Extremity pain, Back Pain, Neck Pain





- DERM


Skin Color: Normal


Skin Problems: None





Past Medical History





- General


Information source: Patient





- Social History


Smoking Status: Current Every Day Smoker


Smoking Education Provided: Yes


Frequency of alcohol use: None


Drug Abuse: None


Occupation: retail


Family History: Reviewed & Not Pertinent


Renal/ Medical History: Denies: Hx Peritoneal Dialysis


GI Medical History: Reports: Hx Gastritis, Hx Gastroesophageal Reflux Disease


Past Surgical History: Reports: Hx Appendectomy, Hx Tubal Ligation





- Immunizations


Hx Diphtheria, Pertussis, Tetanus Vaccination: Yes





Vertical Provider Document





- CONSTITUTIONAL


Agree With Documented VS: Yes


Exam Limitations: No Limitations


General Appearance: WD/WN, No Apparent Distress





- INFECTION CONTROL


TRAVEL OUTSIDE OF THE [place] IN LAST 30 DAYS: No





- HEENT


HEENT: Atraumatic, Normocephalic, PERRLA





- NECK


Neck: Supple.  negative: Lymphadenopathy-Left, Lymphadenopathy-Right


Notes: 





Cervical midline tenderness C3 through 7 area, no step-off or deformity





- RESPIRATORY


Respiratory: Breath Sounds Normal, No Respiratory Distress





- CARDIOVASCULAR


Cardiovascular: Regular Rate, Regular Rhythm





- GI/ABDOMEN


Gastrointestinal: Abdomen Soft





- BACK


Back: Abnormal Inspection - Right trapezius muscle tenderness with spasm


Notes: 





Patient with thoracic midline tenderness T4 through 7 area and lower lumbar 

tenderness, no step-offs or deformities





- MUSCULOSKELETAL/EXTREMETIES


Musculoskeletal/Extremeties: MAEW, FROM, Tender - Tenderness over right AC 

joint, no dislocation or deformity, tenderness increases with extension and 

abduction, No Edema





- NEURO


Level of Consciousness: Awake, Alert, Appropriate


Motor/Sensory: No Motor Deficit





- DERM


Integumentary: Warm, Dry, No Rash





Course





- Re-evaluation


Re-evalutation: 





01/27/19 11:19


Patient presents with headache with right trapezius muscle tenderness with spasm

concerning for tension type headache.  No meningeal irritation symptoms.  The 

patient presents with headache without signs of CNS bleed, stroke, infection, or

other serious etiology.  The patient is neurologically intact.  Given the 

extremely low risk of these diagnoses further testing and evaluation for these 

possibilities does not appear to be indicated at this time.  The patient 

presents with low back pain without signs of spinal cord compression, cauda 

equina syndrome, infection, aneurysm, or other serious etiology.  The patient is

neurologically intact.  Given the extremely risk of these diagnoses further 

testing and evaluation for these possibilities does not appear to be indicated 

at this time.  Patient has been instructed to return if the symptoms worsen or 

change in any way.





- Vital Signs


Vital signs: 


                                        











Temp Pulse Resp BP Pulse Ox


 


 98.1 F   84   18   112/73   100 


 


 01/27/19 09:58  01/27/19 09:58  01/27/19 09:58  01/27/19 09:58  01/27/19 09:58














- Diagnostic Test


Radiology reviewed: Reports reviewed





Procedures





- Immobilization


  ** Right Shoulder


Pre-Proc Neuro Vasc Exam: Normal


Immobilizer type: Sling


Performed by: PCT


Post-Proc Neuro Vasc Exam: Normal


Alignment checked and good: Yes





Discharge





- Discharge


Clinical Impression: 


 Tension headache





Fall


Qualifiers:


 Encounter type: initial encounter Qualified Code(s): W19.XXXA - Unspecified 

fall, initial encounter





Cervical strain, acute


Qualifiers:


 Encounter type: initial encounter Qualified Code(s): S16.1XXA - Strain of 

muscle, fascia and tendon at neck level, initial encounter





Sprain of right shoulder


Qualifiers:


 Encounter type: initial encounter Shoulder sprain type: unspecified sprain 

Qualified Code(s): S43.401A - Unspecified sprain of right shoulder joint, 

initial encounter





Back pain


Qualifiers:


 Back pain location: back pain in unspecified location Chronicity: acute Back 

pain laterality: unspecified Qualified Code(s): M54.9 - Dorsalgia, unspecified





Condition: Stable


Disposition: HOME, SELF-CARE


Instructions:  Ice & Elevation ([facility]), Low Back Pain ([facility]), Muscle Relaxers 

([facility]), Muscle Strain ([facility]), Neck Injury (Cervical Strain) ([facility]), Shoulder Injury

([facility]), Temporary Sling ([facility]), Tension Headache ([facility]), Upper Back Strain ([facility])


Additional Instructions: 


Return immediately for any new or worsening symptoms





Followup with your primary care provider, call tomorrow to make a followup 

appointment





Follow-up with orthopedics for any persistent pain or problems


Prescriptions: 


Cyclobenzaprine HCl [Flexeril 10 Mg Tablet] 10 mg PO TID #15 tablet


Hydrocodone/Acetaminophen [Norco 5-325 mg Tablet] 1 tab PO Q6 PRN #15 tablet


 PRN Reason: 


Forms:  Return to Work, Smoking Cessation Education


Referrals: 


JONO ERAZO MD [Primary Care Provider] - Follow up as needed


[facility] [Provider Group] - Follow up as needed

## 2019-01-27 NOTE — RADIOLOGY REPORT (SQ)
EXAM DESCRIPTION:  CT CERVICAL SPINE WITHOUT



COMPLETED DATE/TIME:  1/27/2019 10:57 am



REASON FOR STUDY:  fall



COMPARISON:  None.



TECHNIQUE:  Axial images acquired through the cervical spine without intravenous contrast.  Images re
viewed with lung, soft tissue and bone windows.  Reconstructed coronal and sagittal MPR images review
ed.  Images stored on PACS.

All CT scanners at this facility use dose modulation, iterative reconstruction, and/or weight based d
osing when appropriate to reduce radiation dose to as low as reasonably achievable (ALARA).

[facility]: Dose Right  [facility]: CareDose    [facility]: Dose Right    [facility]: Teradose 4D    [facility]: Smart Berggi



RADIATION DOSE:  CT Rad equipment meets quality standard of care and radiation dose reduction techniq
ues were employed. CTDIvol: 13.8 mGy. DLP: 323 mGy-cm. mGy.



LIMITATIONS:  None.



FINDINGS:  ALIGNMENT: Anatomic.

MINERALIZATION: Normal.

VERTEBRAL BODIES: No fractures or dislocation.

DISCS: No significant disc disease.

FACETS, LATERAL MASSES, POSTERIOR ELEMENTS: No fractures.  No dislocation.  No acute findings.

HARDWARE: None in the spine.

VISUALIZED RIBS: No fractures.

LUNG APICES AND SOFT TISSUES: No significant or acute findings.

OTHER: No other significant finding.



IMPRESSION:  NO ACUTE OR SIGNIFICANT FINDINGS IN THE CERVICAL SPINE.



TECHNICAL DOCUMENTATION:  JOB ID:  [id number]

Quality ID # [id number]: Final reports with documentation of one or more dose reduction techniques (e.g., Au
tomated exposure control, adjustment of the mA and/or kV according to patient size, use of iterative 
reconstruction technique)

 2011 [employer]- All Rights Reserved



Reading location - IP/workstation name: [email or web address]

## 2019-01-27 NOTE — RADIOLOGY REPORT (SQ)
EXAM DESCRIPTION:  T SPINE AP/LAT



COMPLETED DATE/TIME:  1/27/2019 10:44 am



REASON FOR STUDY:  fall fall, injury, thoracic pain



COMPARISON:  None.



NUMBER OF VIEWS:  Two views.



TECHNIQUE:  AP and lateral radiographic images acquired of the thoracic spine.



LIMITATIONS:  None.



FINDINGS:  MINERALIZATION: Normal.

ALIGNMENT: Normal.  No scoliosis.

VERTEBRAE: No fracture or bone lesion.  Maintained height, normal segmentation.

DISCS: No significant loss of height or significant narrowing.  No large osteophytes.

HARDWARE: None in the spine.

MEDIASTINUM AND SOFT TISSUES: Normal heart size and aortic contour.  No soft tissue abnormality.

VISUALIZED LUNG FIELDS: Clear.

OTHER: No other significant finding.



IMPRESSION:  NO SIGNIFICANT RADIOGRAPHIC FINDING IN THE THORACIC SPINE.



TECHNICAL DOCUMENTATION:  JOB ID:  [id number]

 2011 [employer]- All Rights Reserved



Reading location - IP/workstation name: [email or web address]

## 2019-10-13 NOTE — ER DOCUMENT REPORT
ED General





- General


Chief Complaint: Flank Pain


Stated Complaint: LOWER BACK PAIN


Time Seen by Provider: 10/13/19 17:03


Primary Care Provider: 


JONO ERAZO MD [Primary Care Provider] - Follow up in 3-5 days


TRAVEL OUTSIDE OF THE [place] IN LAST 30 DAYS: No





- HPI


Notes: 





44-year-old female to the emergency department with complaints of lower 

abdominal pain and bilateral low back and flank pain that has been ongoing for 

several weeks.  She states that she has dark and malodorous urine.  She states 

that she is been having urinary frequency.  She states that when she urinates s

he feels like she has not completely voided.  She states that she was hoping to 

just get better and that is why she was not seen sooner.  She denies any fevers.

 She states that she has had chills.  She admits to nausea but denies any 

vomiting or diarrhea.  She did endorse a history of fibroids and endometriosis. 

Patient also reports that she has had her period for 1 week which is typically 

longer than her normal.  She also induces occasional fatigue.





- Related Data


Allergies/Adverse Reactions: 


                                        





No Known Allergies Allergy (Verified 10/13/19 17:01)


   











Past Medical History





- General


Information source: Patient





- Social History


Smoking Status: Current Every Day Smoker


Chew tobacco use (# tins/day): No


Frequency of alcohol use: None


Drug Abuse: None


Family History: Reviewed & Not Pertinent


Patient has suicidal ideation: No


Patient has homicidal ideation: No


Renal/ Medical History: Denies: Hx Peritoneal Dialysis


GI Medical History: Reports: Hx Gastritis, Hx Gastroesophageal Reflux Disease


Past Surgical History: Reports: Hx Appendectomy, Hx Tonsillectomy, Hx Tubal 

Ligation





- Immunizations


Hx Diphtheria, Pertussis, Tetanus Vaccination: Yes





Review of Systems





- Review of Systems


Constitutional: Chills, Malaise.  denies: Fever


EENT: No symptoms reported


Cardiovascular: denies: Chest pain, Palpitations, Heart racing, Orthopnea, 

Syncope, Dizziness, Lightheaded


Respiratory: denies: Cough, Short of breath


Gastrointestinal: Abdominal pain, Nausea.  denies: Diarrhea, Vomiting


Genitourinary: Frequency, Flank pain.  denies: Burning, Dysuria, Hematuria, 

Incontinence, Retention


Female Genitourinary: No symptoms reported


Musculoskeletal: No symptoms reported


Skin: No symptoms reported


Hematologic/Lymphatic: No symptoms reported


Neurological/Psychological: No symptoms reported


-: Yes All other systems reviewed and negative





Physical Exam





- Vital signs


Vitals: 


                                        











Temp Pulse Resp BP Pulse Ox


 


 98.8 F   94   15   122/84   100 


 


 10/13/19 16:53  10/13/19 16:53  10/13/19 16:53  10/13/19 16:53  10/13/19 16:53














- General


General appearance: Appears well, Alert


In distress: None





- HEENT


Head: Normocephalic, Atraumatic


Eyes: Normal


Pupils: PERRL


Ears: Normal


External canal: Normal


Tympanic membrane: Normal


Sinus: Normal


Nasal: Normal


Mouth/Lips: Normal


Mucous membranes: Normal


Pharynx: Normal.  No: Potential airway comprom.


Neck: Normal, Supple.  No: Lymphadenopathy, Meningismus





- Respiratory


Respiratory status: No respiratory distress


Chest status: Nontender.  No: Pain on movement, Pain with cough, Pain with deep 

breathing


Breath sounds: Normal.  No: Rales, Rhonchi, Stridor, Wheezing


Chest palpation: Normal





- Cardiovascular


Rhythm: Regular


Heart sounds: Normal auscultation


Murmur: No





- Abdominal


Inspection: Normal, Obese


Distension: No distension.  No: Distended


Bowel sounds: Normal


Tenderness: Tender - + suprapubic TTP, negative McBurney's point, no Guzmán's 

sign.  No: McBurney's point, Guzmán's sign, Guarding, Rebound, Other


Organomegaly: No organomegaly





- Back


Back: CVA tenderness - + mild left CVAT, no right CVAT..  No: Vertebra 

tenderness





- Extremities


General upper extremity: Normal inspection, Nontender, Normal color, Normal ROM,

Normal temperature


General lower extremity: Normal inspection, Nontender, Normal color, Normal ROM,

Normal temperature, Normal weight bearing.  No: Cathie's sign





- Neurological


Neuro grossly intact: Yes


Cognition: Normal


Orientation: AAOx4


Gilmar Coma Scale Eye Opening: Spontaneous


Ovid Coma Scale Verbal: Oriented


Gilmar Coma Scale Motor: Obeys Commands


Gilmar Coma Scale Total: 15


Speech: Normal


Motor strength normal: LUE, RUE, LLE, RLE


Sensory: Normal





- Psychological


Associated symptoms: Normal affect, Normal mood





- Skin


Skin Temperature: Warm


Skin Moisture: Dry


Skin Color: Normal





Course





- Re-evaluation


Re-evalutation: 





10/13/19 19:06





Laboratory











  10/13/19 10/13/19 10/13/19





  17:16 17:16 17:16


 


WBC  7.2  


 


RBC  4.76  


 


Hgb  12.4  


 


Hct  38.0  


 


MCV  80  


 


MCH  25.9 L  


 


MCHC  32.5  


 


RDW  15.3 H  


 


Plt Count  280  


 


Lymph % (Auto)  36.7  


 


Mono % (Auto)  4.9  


 


Eos % (Auto)  0.5  


 


Baso % (Auto)  0.7  


 


Absolute Neuts (auto)  4.1  


 


Absolute Lymphs (auto)  2.7  


 


Absolute Monos (auto)  0.4  


 


Absolute Eos (auto)  0.0  


 


Absolute Basos (auto)  0.0  


 


Seg Neutrophils %  57.2  


 


Sodium   139.3 


 


Potassium   4.1 


 


Chloride   104 


 


Carbon Dioxide   24 


 


Anion Gap   11 


 


BUN   16 


 


Creatinine   0.67 


 


Est GFR ( Amer)   > 60 


 


Est GFR (MDRD) Non-Af   > 60 


 


Glucose   81 


 


Calcium   9.5 


 


Total Bilirubin   0.4 


 


Direct Bilirubin   0.1 


 


Neonat Total Bilirubin   Not Reportable 


 


Neonat Direct Bilirubin   Not Reportable 


 


Neonat Indirect Bili   Not Reportable 


 


AST   17 


 


ALT   11 


 


Alkaline Phosphatase   57 


 


Total Protein   8.5 H 


 


Albumin   4.8 


 


Serum HCG, Qual    NEGATIVE


 


Urine Color   


 


Urine Appearance   


 


Urine pH   


 


Ur Specific Gravity   


 


Urine Protein   


 


Urine Glucose (UA)   


 


Urine Ketones   


 


Urine Blood   


 


Urine Nitrite (Reflex)   


 


Urine Bilirubin   


 


Urine Urobilinogen   


 


Leukocyte Esterase Rfl   


 


Urine RBC (Auto)   


 


Urine Bacteria (Auto)   


 


Urine WBC (Reflex)   


 


Squamous Epi Cells Auto   


 


Urine Mucus (Auto)   


 


Urine Ascorbic Acid   














  10/13/19





  17:16


 


WBC 


 


RBC 


 


Hgb 


 


Hct 


 


MCV 


 


MCH 


 


MCHC 


 


RDW 


 


Plt Count 


 


Lymph % (Auto) 


 


Mono % (Auto) 


 


Eos % (Auto) 


 


Baso % (Auto) 


 


Absolute Neuts (auto) 


 


Absolute Lymphs (auto) 


 


Absolute Monos (auto) 


 


Absolute Eos (auto) 


 


Absolute Basos (auto) 


 


Seg Neutrophils % 


 


Sodium 


 


Potassium 


 


Chloride 


 


Carbon Dioxide 


 


Anion Gap 


 


BUN 


 


Creatinine 


 


Est GFR ( Amer) 


 


Est GFR (MDRD) Non-Af 


 


Glucose 


 


Calcium 


 


Total Bilirubin 


 


Direct Bilirubin 


 


Neonat Total Bilirubin 


 


Neonat Direct Bilirubin 


 


Neonat Indirect Bili 


 


AST 


 


ALT 


 


Alkaline Phosphatase 


 


Total Protein 


 


Albumin 


 


Serum HCG, Qual 


 


Urine Color  YELLOW


 


Urine Appearance  SLIGHTLY-CLOUDY


 


Urine pH  7.0


 


Ur Specific Gravity  1.017


 


Urine Protein  NEGATIVE


 


Urine Glucose (UA)  NEGATIVE


 


Urine Ketones  NEGATIVE


 


Urine Blood  LARGE H


 


Urine Nitrite (Reflex)  NEGATIVE


 


Urine Bilirubin  NEGATIVE


 


Urine Urobilinogen  NEGATIVE


 


Leukocyte Esterase Rfl  NEGATIVE


 


Urine RBC (Auto)  2


 


Urine Bacteria (Auto)  TRACE


 


Urine WBC (Reflex)  2


 


Squamous Epi Cells Auto  8


 


Urine Mucus (Auto)  RARE


 


Urine Ascorbic Acid  NEGATIVE








Impression:  Low back -- noted UA with + WBC and tract bacteria -- given her 

symptoms, will culture the urine and then start on three day course of macrobid.

  Will have her follow with PCP. Patient agrees with the plan. 





- Vital Signs


Vital signs: 


                                        











Temp Pulse Resp BP Pulse Ox


 


 98.8 F   94   15   122/84   100 


 


 10/13/19 16:53  10/13/19 16:53  10/13/19 16:53  10/13/19 16:53  10/13/19 16:53














- Laboratory


Result Diagrams: 


                                 10/13/19 17:16





                                 10/13/19 17:16


Laboratory results interpreted by me: 


                                        











  10/13/19 10/13/19 10/13/19





  17:16 17:16 17:16


 


MCH  25.9 L  


 


RDW  15.3 H  


 


Total Protein   8.5 H 


 


Urine Blood    LARGE H














Discharge





- Discharge


Clinical Impression: 


 Low back pain, Urinary frequency





UTI (urinary tract infection)


Qualifiers:


 Urinary tract infection type: acute cystitis Hematuria presence: without 

hematuria Qualified Code(s): N30.00 - Acute cystitis without hematuria





Condition: Stable


Disposition: HOME, SELF-CARE


Instructions:  Nitrofurantoin ([facility]), Urinary Tract Infection ([facility])


Additional Instructions: 


TAKE ALL MEDICINES AS PRESCRIBED.   RETURN IF WORSENING SYMPTOMS.  PUSH FLUIDS. 

FOLLOW UP WITH PRIMARY CARE. 


Prescriptions: 


Nitrofurantoin/Nitrofuran Mac [Macrobid 100 mg Capsule] 1 tab PO BID #6 capsule


Phenazopyridine HCl [Pyridium 100 Mg Tablet] 200 mg PO TID PRN #6 tablet


 PRN Reason: 


Referrals: 


JONO ERAZO MD [Primary Care Provider] - Follow up in 3-5 days

## 2019-10-13 NOTE — ER DOCUMENT REPORT
ED Medical Screen (RME)





- General


Chief Complaint: Flank Pain


Stated Complaint: LOWER BACK PAIN


Time Seen by Provider: 10/13/19 17:03


Primary Care Provider: 


JONO ERAOZ MD [Primary Care Provider] - Follow up as needed


Notes: 





Patient presents complaining of bilateral flank pain for several months.  

Patient states she has had chills and nausea.  Patient reports her urine is dark

and malodorous.





I have greeted and performed a rapid initial assessment of this patient.  A 

comprehensive ED assessment and evaluation of the patient, analysis of test 

results and completion of the medical decision making process will be conducted 

by additional ED providers.


TRAVEL OUTSIDE OF THE [place] IN LAST 30 DAYS: No





- Related Data


Allergies/Adverse Reactions: 


                                        





No Known Allergies Allergy (Verified 10/13/19 17:01)


   











Past Medical History





- Social History


Chew tobacco use (# tins/day): No


Drug Abuse: None


Family history: Reviewed & Not Pertinent


Renal/ Medical History: Denies: Hx Peritoneal Dialysis


GI Medical History: Reports: Hx Gastritis, Hx Gastroesophageal Reflux Disease


Past Surgical History: Reports: Hx Appendectomy, Hx Tonsillectomy, Hx Tubal 

Ligation





- Immunizations


Hx Diphtheria, Pertussis, Tetanus Vaccination: Yes





Physical Exam





- Vital signs


Vitals: 





                                        











Temp Pulse Resp BP Pulse Ox


 


 98.8 F   94   15   122/84   100 


 


 10/13/19 16:53  10/13/19 16:53  10/13/19 16:53  10/13/19 16:53  10/13/19 16:53














- Back


Back: CVA tenderness - bilat





Course





- Vital Signs


Vital signs: 





                                        











Temp Pulse Resp BP Pulse Ox


 


 98.8 F   94   15   122/84   100 


 


 10/13/19 16:53  10/13/19 16:53  10/13/19 16:53  10/13/19 16:53  10/13/19 16:53














Doctor's Discharge





- Discharge


Referrals: 


JONO ERAZO MD [Primary Care Provider] - Follow up as needed

## 2020-07-24 NOTE — ER DOCUMENT REPORT
ED General





- General


Chief Complaint: Nausea


Stated Complaint: NAUSEA,DIARRHEA,ABDOMINAL PAIN


Primary Care Provider: 


JONO ERAZO MD [Primary Care Provider] - Follow up as needed


Notes: 





Patient is a 44-year-old white female with a history of GERD, status post 

appendectomy and adenoidectomy as well as tubal ligation in the past who 

presents the emergency department with a chief complaint of nausea and diarrhea.

 She states that began Tuesday, 3 days ago.  She states that she took her child 

out to dinner for their birthday at [facility].  She reports having an omelette with 

steak, mushrooms and other items cooked in.  She states later that evening after

dinner she began feeling nauseous.  States that she began having diarrhea with 

cramping in the abdomen.  She states she has had maybe 3-4 episodes of diarrhea 

with cramping per day.  States it has been associated with ongoing nausea but no

vomiting.  States given its ongoing duration she decided to come for evaluation.

 She denies any known sick contacts or recent travel.  Denies any fever chills 

or night sweats.  No urinary complaints.  No overt abdominal pain.  No chest 

pain or shortness of breath.  Denies any urinary complaints.


TRAVEL OUTSIDE OF THE [place] IN LAST 30 DAYS: No





- Related Data


Allergies/Adverse Reactions: 


                                        





No Known Allergies Allergy (Verified 10/13/19 17:01)


   








Home Medications: Nexium





Past Medical History





- Social History


Smoking Status: Unknown if Ever Smoked


Family History: Reviewed & Not Pertinent


Renal/ Medical History: Denies: Hx Peritoneal Dialysis


GI Medical History: Reports: Hx Gastritis, Hx Gastroesophageal Reflux Disease


Past Surgical History: Reports: Hx Appendectomy, Hx Tonsillectomy, Hx Tubal 

Ligation





- Immunizations


Hx Diphtheria, Pertussis, Tetanus Vaccination: Yes





Review of Systems





- Review of Systems


Constitutional: denies: Fever


EENT: denies: Throat pain


Cardiovascular: denies: Chest pain


Respiratory: denies: Short of breath


Gastrointestinal: Diarrhea, Nausea.  denies: Vomiting


Genitourinary: denies: Dysuria


Female Genitourinary: denies: Vaginal discharge


Musculoskeletal: denies: Back pain


Skin: denies: Change in color


Hematologic/Lymphatic: denies: Easy bleeding


Neurological/Psychological: denies: Headaches





Physical Exam





- Vital signs


Vitals: 


                                        











Temp Pulse Resp BP Pulse Ox


 


 98.4 F   73   16   102/77   98 


 


 07/24/20 09:21  07/24/20 09:21  07/24/20 09:21  07/24/20 09:21  07/24/20 09:21














- General


General appearance: Appears well, Alert


In distress: None





- HEENT


Head: Normocephalic, Atraumatic


Eyes: Normal


Conjunctiva: Normal


Extraocular movements intact: Yes


Eyelashes: Normal


Ears: Normal


External canal: Normal


Tympanic membrane: Normal


Mouth/Lips: Normal


Mucous membranes: Normal


Pharynx: Normal


Neck: Normal, Supple





- Respiratory


Respiratory status: No respiratory distress


Chest status: Nontender


Breath sounds: Normal


Chest palpation: Normal





- Cardiovascular


Rhythm: Regular


Heart sounds: Normal auscultation





- Abdominal


Inspection: Normal


Distension: No distension


Bowel sounds: Normal


Tenderness: Tender - Left upper quadrant


Organomegaly: No organomegaly





- Back


Back: No: CVA tenderness





- Neurological


Neuro grossly intact: Yes


Cognition: Normal


Orientation: AAOx4





- Psychological


Associated symptoms: Normal affect, Normal mood





- Skin


Skin Temperature: Warm


Skin Moisture: Dry


Skin Color: Normal





Course





- Re-evaluation


Re-evalutation: 





07/24/20 12:07


Work-up largely unremarkable.  Patient low risk for COVID-19.  Has a UTI per 

urinalysis.  Given Rocephin IV here and sent home on Bactrim and a course of 

Zofran should the nausea persist.  Encouraged push clear fluids and rest.  She 

requested a work note for a few days to rest and recover.  We will provide.  

Counseled her regarding the importance of outpatient follow-up and advised that 

she return here or any ER immediately with any new, persistent or worsening 

symptoms.  She verbalized understood and agreed.





- Vital Signs


Vital signs: 


                                        











Temp Pulse Resp BP Pulse Ox


 


 98.4 F   73   16   102/77   98 


 


 07/24/20 09:21  07/24/20 09:21  07/24/20 09:21  07/24/20 09:21  07/24/20 09:21














- Laboratory


Result Diagrams: 


                                 07/24/20 09:40





                                 07/24/20 09:40


Laboratory results interpreted by me: 


                                        











  07/24/20 07/24/20 07/24/20





  09:40 09:40 10:33


 


Hgb  11.5 L  


 


Hct  35.5 L  


 


MCV  75 L  


 


MCH  24.2 L  


 


RDW  16.7 H  


 


Chloride   109 H 


 


Carbon Dioxide   21 L 


 


Urine Nitrite    POSITIVE H














Discharge





- Discharge


Clinical Impression: 


UTI (urinary tract infection)


Qualifiers:


 Urinary tract infection type: site unspecified Hematuria presence: without 

hematuria Qualified Code(s): N39.0 - Urinary tract infection, site not specified





Condition: Stable


Disposition: HOME, SELF-CARE


Instructions:  Urinary Tract Infection ([facility]), Trimethoprim-Sulfa ([facility])


Additional Instructions: 


Follow-up with your regular doctor in 2 to 3 days for reevaluation.  Return here

or any ER immediately with any new, persistent or worsening symptoms.


Prescriptions: 


Sulfamethoxazole/Trimethoprim [Bactrim Ds Tablet] 1 each PO BID #20 tablet


Ondansetron [Zofran Odt 4 mg Tablet] 4 mg PO Q8 PRN #20 tab.rapdis


 PRN Reason: 


Forms:  Return to Work


Referrals: 


JONO ERAZO MD [Primary Care Provider] - Follow up as needed

## 2020-11-22 NOTE — ER DOCUMENT REPORT
ED Medical Screen (RME)





- General


Chief Complaint: Vaginal Bleeding


Stated Complaint: VAGINAL BLEEDING


Time Seen by Provider: 11/22/20 18:59


Primary Care Provider: 


JONO ERAZO MD [Primary Care Provider] - Follow up as needed


Notes: 





HPI: 45-year-old female with history of fibroids and endometriosis presenting 

for vaginal bleeding for 3 weeks.  Has been using approximately 3 menstrual pads

daily.  Reports pelvic pain worse on the right.  Does not an OB/GYN that she 

follows with.





PHYSICAL EXAMINATION:  exam deferred in triage.  Mild tenderness to the 

suprapubic right pelvis on palpation











I have greeted and performed a rapid initial assessment of this patient.  A 

comprehensive ED assessment and evaluation of the patient, analysis of test 

results and completion of medical decision making process will be conducted by 

an additional ED providers.


TRAVEL OUTSIDE OF THE [place] IN LAST 30 DAYS: No





- Related Data


Allergies/Adverse Reactions: 


                                        





No Known Allergies Allergy (Verified 11/22/20 18:50)


   











Past Medical History





- Social History


Chew tobacco use (# tins/day): No


Frequency of alcohol use: None


Drug Abuse: None


Family history: Reviewed & Not Pertinent


Renal/ Medical History: Denies: Hx Peritoneal Dialysis


GI Medical History: Reports: Hx Gastritis, Hx Gastroesophageal Reflux Disease


Past Surgical History: Reports: Hx Appendectomy, Hx Tonsillectomy, Hx Tubal 

Ligation





- Immunizations


Hx Diphtheria, Pertussis, Tetanus Vaccination: Yes





Physical Exam





- Vital signs


Vitals: 





                                        











Temp Pulse Resp BP Pulse Ox


 


 98.5 F   91   20   130/86 H  100 


 


 11/22/20 18:35  11/22/20 18:35  11/22/20 18:35  11/22/20 18:35  11/22/20 18:35














Course





- Vital Signs


Vital signs: 





                                        











Temp Pulse Resp BP Pulse Ox


 


 98.5 F   91   20   130/86 H  100 


 


 11/22/20 18:35  11/22/20 18:35  11/22/20 18:35  11/22/20 18:35  11/22/20 18:35














Doctor's Discharge





- Discharge


Referrals: 


JONO ERAZO MD [Primary Care Provider] - Follow up as needed

## 2020-11-22 NOTE — RADIOLOGY REPORT (SQ)
EXAM DESCRIPTION:

 U/S NON-OB PELVIS TV W/O DOP

[facility]: US PELVIS TRANSVAGINAL 



CLINICAL HISTORY: 

45 years  Female;  vag bleeding;



TECHNIQUE: 

Endovaginal pelvic ultrasound was performed. 



COMPARISON: None.



FINDINGS:

Uterus: 8.3 x 4.9 x 4.3 cm, with 5 mm endometrial stripe. 

Slightly hypoechoic lesion in the anterior lower uterine segment

myometrium, 2.1 x 2 x 1.6 cm, typical for fibroid.



Cervix: 2 cm long, closed



Right ovary: 2.5 x 2 x 1.9 cm. Normal vascular flow on Doppler.

Left ovary: 1.9 x 1.4 x 1.7 cm. Normal vascular flow on Doppler.

Trace fluid in the cul-de-sac, likely physiologic



IMPRESSION:



1.  Uterine fibroid. This is adjacent to the endometrial canal,

but does not distort the endometrial canal. It is a potential

source of hemorrhage. 

2.  No ovarian mass or torsion

## 2020-11-22 NOTE — ER DOCUMENT REPORT
ED GI/





- General


Chief Complaint: Vaginal Bleeding


Stated Complaint: VAGINAL BLEEDING


Time Seen by Provider: 11/22/20 18:59


Primary Care Provider: 


JONO ERAZO MD [Primary Care Provider] - Follow up as needed


MAYA MORENO MD [ACTIVE STAFF] - Follow up as needed


Mode of Arrival: Ambulatory


Information source: Patient


Notes: 





45-year-old female past medical history significant for endometriosis and 

uterine fibroids presents to the emergency room complaining of persistent 

vaginal bleeding for the past 3 weeks.  Complains of mild pelvic cramping.  

States she started her menstrual cycle on November 6 and has been bleeding ever 

since.  States has been passing clots.  States that sometimes it is light other 

times it is heavy.  She has not seen an OB/GYN for his symptoms.  He does not 

take anything for the pain.  Denies nausea, vomiting, no urinary symptoms.


TRAVEL OUTSIDE OF THE [place] IN LAST 30 DAYS: No





- Related Data


Allergies/Adverse Reactions: 


                                        





No Known Allergies Allergy (Verified 11/22/20 18:50)


   











Past Medical History





- General


Information source: Patient





- Social History


Smoking Status: Current Every Day Smoker


Chew tobacco use (# tins/day): No


Frequency of alcohol use: None


Drug Abuse: None


Family History: Reviewed & Not Pertinent


Patient has homicidal ideation: No


Renal/ Medical History: Denies: Hx Peritoneal Dialysis


GI Medical History: Reports: Hx Gastritis, Hx Gastroesophageal Reflux Disease


Past Surgical History: Reports: Hx Appendectomy, Hx Tonsillectomy, Hx Tubal 

Ligation





- Immunizations


Hx Diphtheria, Pertussis, Tetanus Vaccination: Yes





Review of Systems





- Review of Systems


Constitutional: No symptoms reported


Cardiovascular: No symptoms reported


Respiratory: No symptoms reported


Gastrointestinal: Abdominal pain.  denies: Diarrhea, Nausea, Vomiting, 

Constipation


Genitourinary: No symptoms reported


Female Genitourinary: Vaginal bleeding


Musculoskeletal: No symptoms reported


Skin: No symptoms reported


Neurological/Psychological: No symptoms reported


-: Yes All other systems reviewed and negative





Physical Exam





- Vital signs


Vitals: 


                                        











Temp Pulse Resp BP Pulse Ox


 


 98.5 F   91   20   130/86 H  100 


 


 11/22/20 18:35  11/22/20 18:35  11/22/20 18:35  11/22/20 18:35  11/22/20 18:35














- Notes


Notes: 








GENERAL: Mild acute distress, non-toxic appearance.  


HEAD:  Normal with no signs of head trauma.


EYES:  PERRLA, EOMI, conjunctiva normal, no discharge.  


EARS:  Hearing grossly intact.


NOSE: Normal.


THROAT:  Oropharynx is normal. 


NECK:  Normal range of motion, no tenderness, supple, no lymphadenopathy, No 

adenopathy, no JVD.   


CHEST:  Clear breath sounds bilaterally.  No wheezes, rales, or rhonchi.  


CARDIAC:  Regular rate and rhythm.  S1 and S2, without murmurs, gallops, or 

rubs.


VASCULAR:  No Edema.  Peripheral pulses normal and equal in all extremities.


ABDOMEN: Normal and soft with no tenderness, no masses or pulsatile masses.  No 

organomegaly.  Positive bowel sounds x4.  No CVA tenderness noted bilaterally.


GASTROINTESTINAL: Bowel sounds normal


LYMPATHTIC:  No lymphadenopathy noted.


MUSCULOSKELETAL:  Good range of motion of all major joints. Extremities without 

clubbing, cyanosis or edema.  


NEUROLOGICAL:  Alert and oriented x 3.  No focal sensory or strength deficits.  

Speech normal.  Follows commands appropriately.


PSYCHIATRIC:  Normal Affect, judgement and mood.


SKIN:  Normal appearance with no rashes or lesions.





- Genitourinary


Notes: 





Patient refused pelvic exam.





Course





- Re-evaluation


Re-evalutation: 





11/22/20 21:53


Patient is resting comfortably she is afebrile, she is nontoxic-appearing, she 

is pain-free on exam.  Reviewed all lab and ultrasound findings with the 

patient.  Patient with a known history of uterine fibroids.  Counseled patient 

that she is mildly anemic but not severe enough to need a blood transfusion.  

Asymptomatic with the anemia.  Denies chest pain, shortness of breath, no 

difficulty breathing.  No dizziness.  Discussed ultrasound findings of the 

fibroid and the importance of an outpatient follow-up with OB/GYN.  Counseled 

take Tylenol as needed for pain.  Avoid anti-inflammatory medication secondary 

to the increased bleeding.  Patient was given strict return to the emergency 

room guidelines.  Return for any new or worsening symptoms.  All questions were 

answered.  Patient verbalized understanding and agrees with plan of care.


11/22/20 21:56








- Vital Signs


Vital signs: 


                                        











Temp Pulse Resp BP Pulse Ox


 


 98.5 F   91   20   130/86 H  100 


 


 11/22/20 18:35  11/22/20 18:35  11/22/20 18:35  11/22/20 18:35  11/22/20 18:35














- Laboratory


Result Diagrams: 


                                 11/22/20 20:09





                                 11/22/20 20:09


Laboratory results interpreted by me: 


                                        











  11/22/20 11/22/20





  20:09 20:09


 


Hgb  10.3 L 


 


Hct  31.9 L 


 


MCV  75 L 


 


MCH  24.0 L 


 


RDW  17.2 H 


 


Urine Blood   LARGE H


 


Urine Urobilinogen   2.0 H














- Diagnostic Test


Radiology reviewed: Reports reviewed





Discharge





- Discharge


Clinical Impression: 


 Dysfunctional uterine bleeding





Uterine fibroid


Qualifiers:


 Uterine leiomyoma location: unspecified location Qualified Code(s): D25.9 - 

Leiomyoma of uterus, unspecified





Anemia


Qualifiers:


 Anemia type: unspecified type Qualified Code(s): D64.9 - Anemia, unspecified





Condition: Stable


Disposition: HOME, SELF-CARE


Instructions:  Anemia ([facility]), Dysfunctional Uterine Bleeding ([facility])


Additional Instructions: 


Outpatient follow-up with OB/GYN as soon as possible.  Iron rich foods.  Tylenol

as needed for pain.  Avoid NSAIDs secondary to bleeding.  Return to the 

emergency room for any new or worsening symptoms.


Referrals: 


JONO ERAZO MD [Primary Care Provider] - Follow up as needed


MAYA MORENO MD [ACTIVE STAFF] - Follow up as needed